# Patient Record
Sex: FEMALE | Race: ASIAN
[De-identification: names, ages, dates, MRNs, and addresses within clinical notes are randomized per-mention and may not be internally consistent; named-entity substitution may affect disease eponyms.]

---

## 2017-06-06 ENCOUNTER — HOSPITAL ENCOUNTER (EMERGENCY)
Dept: HOSPITAL 74 - JER | Age: 77
LOS: 1 days | Discharge: HOME | End: 2017-06-07
Payer: COMMERCIAL

## 2017-06-06 VITALS — BODY MASS INDEX: 26.4 KG/M2

## 2017-06-06 DIAGNOSIS — M47.816: Primary | ICD-10-CM

## 2017-06-06 DIAGNOSIS — G30.9: ICD-10-CM

## 2017-06-06 DIAGNOSIS — F02.80: ICD-10-CM

## 2017-06-06 DIAGNOSIS — E78.00: ICD-10-CM

## 2017-06-06 LAB
APPEARANCE UR: CLEAR
BILIRUB UR STRIP.AUTO-MCNC: NEGATIVE MG/DL
COLOR UR: (no result)
KETONES UR QL STRIP: NEGATIVE
LEUKOCYTE ESTERASE UR QL STRIP.AUTO: (no result)
NITRITE UR QL STRIP: NEGATIVE
PH UR: 7 [PH] (ref 5–8)
PROT UR QL STRIP: NEGATIVE
PROT UR QL STRIP: NEGATIVE
RBC # BLD AUTO: 1 /HPF (ref 0–3)
RBC # UR STRIP: NEGATIVE /UL
UROBILINOGEN UR STRIP-MCNC: NEGATIVE E.U./DL (ref 0.2–1)
WBC # UR AUTO: 11 /HPF (ref 3–5)

## 2017-06-06 PROCEDURE — 3E0233Z INTRODUCTION OF ANTI-INFLAMMATORY INTO MUSCLE, PERCUTANEOUS APPROACH: ICD-10-PCS

## 2017-06-06 NOTE — PDOC
History of Present Illness





<Susan Bolanos - Last Filed: 06/07/17 00:25>





- General


History Source: Patient


Exam Limitations: No Limitations





- History of Present Illness


Initial Comments: 


76 yo F BIBA with a PMHx of HLD and alzheimer's presents with worsening lower 

back pain that radiates down her L leg for two days.  Patients Hx provided via 

 phones as patient speaks Portuguese. Patient notes that when she lifts 

her L leg her L hip hurts. Patient reports falling in January and experiencing 

similar symptoms that went away. Patient also endorses tingling in L foot. 

Patient denies recent trauma. Patient denies numbness. Patient denies fevers, 

chills, nausea, vomiting and diarrhea.  Patient denies chest pain, palpitations 

and lightheadedness. Patient denies problems with urination. 








 phone- [phone]








<GeoffreyyanelyZehra asher - Last Filed: 06/07/17 00:40>





- General


Chief Complaint: Back Pain


Stated Complaint: LOWER BACK PAIN


Time Seen by Provider: 06/06/17 20:59





Past History





- Past Medical History


Suicide Attempt (Hx): No





- Surgical History


Cholecystectomy: Yes (MORE THAN 10 YEARS AGO)





- Psycho/Social/Smoking Cessation Hx


Anxiety: No


Suicidal Ideation: No


Smoking History: Never smoked


Have you smoked in the past 12 months: No


Hx Alcohol Use: No


Drug/Substance Use Hx: No


Substance Use Type: None





<Susan Bolanos - Last Filed: 06/07/17 00:25>





<GeoffreybrooklynZehra - Last Filed: 06/07/17 00:40>





- Past Medical History


Allergies/Adverse Reactions: 


 Allergies











Allergy/AdvReac Type Severity Reaction Status Date / Time


 


No Known Allergies Allergy   Verified 06/06/17 21:13











Home Medications: 


Ambulatory Orders





Nitrofurantoin Macrocrystal [Macrodantin -] 100 mg PO BID #14 capsule 06/07/17 











**Review of Systems





- Review of Systems


Able to Perform ROS?: Yes


Comments:: 


CONSTITUTIONAL: Absent: fever, chills, diaphoresis, generalized weakness, 

malaise, loss of appetite 


HEENT: Absent: rhinorrhea, nasal congestion, throat pain, throat swelling, 

difficulty swallowing, mouth swelling, ear pain, eye pain, visual Changes 


CARDIOVASCULAR: Absent: chest pain, syncope, palpitations, irregular heart rate

, lightheadedness, peripheral edema RESPIRATORY: Absent: cough, shortness of 

breath, dyspnea with exertion, orthopnea, wheezing, stridor, hemoptysis 

GASTROINTESTINAL: Absent: abdominal pain, abdominal distension, nausea, vomiting

, diarrhea, constipation, melena, hematochezia 


GENITOURINARY: Absent: dysuria, frequency, urgency, hesitancy, hematuria, flank 

pain, genital pain 


MUSCULOSKELETAL: + Lower back pain radiating to L leg.  Absent: joint swelling 


SKIN: Absent: rash, itching, pallor


NEUROLOGIC: Absent: headache, focal weakness or paresthesia, dizziness, 

unsteady gait, seizure, mental status changes, bladder or bowel incontinence 


PSYCHIATRIC: Absent: anxiety, depression, suicidal or homicidal ideation, 

hallucination








<JulietZehra - Last Filed: 06/07/17 00:40>





*Physical Exam





- Vital Signs


 Last Vital Signs











Temp Pulse Resp BP Pulse Ox


 


 98.1 F   81   18   124/65   96 


 


 06/06/17 20:54  06/06/17 20:54  06/06/17 20:54  06/06/17 20:54  06/06/17 20:54














<Susan Bolanos - Last Filed: 06/07/17 00:25>





- Vital Signs


 Last Vital Signs











Temp Pulse Resp BP Pulse Ox


 


 98.1 F   81   18   124/65   96 


 


 06/06/17 20:54  06/06/17 20:54  06/06/17 20:54  06/06/17 20:54  06/06/17 20:54














- Physical Exam


Comments: 





GENERAL: 


Well-appearing, well-nourished. No apparent distress.


HEENT: 


Normocephalic, atraumatic. PERRL, EOM intact.


CARDIOVASCULAR: 


Normal S1, S2. Regular rate and rhythm.


PULMONARY: 


Clear to auscultation bilaterally.


ABDOMEN: 


Soft, non-distended, non-tender. 


EXTREMITIES: 


Normal ROM in all four extremities. No gross deformities.


SKIN: 


Warm, dry.  No rash


NEUROLOGICAL: 


No focal neurological deficits. Cranial nerves 2-12 intact. No fecal 

incontinence.  








<JulietZehra - Last Filed: 06/07/17 00:40>





ED Treatment Course





- ADDITIONAL ORDERS


Additional order review: 


 Laboratory  Results











  06/06/17





  22:40


 


Urine Color  Straw


 


Urine Appearance  Clear


 


Urine pH  7.0


 


Urine Protein  Negative


 


Urine Glucose (UA)  Negative


 


Urine Ketones  Negative


 


Urine Blood  Negative


 


Urine Nitrite  Negative


 


Urine Bilirubin  Negative


 


Urine Urobilinogen  Negative


 


Ur Leukocyte Esterase  2+ H


 


Urine RBC  1


 


Urine WBC  11


 


Ur Epithelial Cells  Rare














- RADIOLOGY


Radiograph Interpretation: 


CT Lumbar Spine


Impression: 


L2-L3 moderate degenerative disc disease with mild broad-based disc bulge  L3-

L4 mild broadbase disc bulge probably slightly impinging right L3 nerve root 

with mild to moderate degenerative central spinal canal stenosis. L4-L5 mild 

disc bulge impinging right L4 nerve root with moderate degenerative central 

spinal canal stenosis 








- Medications


Given in the ED: 


ED Medications














Discontinued Medications














Generic Name Dose Route Start Last Admin





  Trade Name Freq  PRN Reason Stop Dose Admin


 


Ketorolac Tromethamine  60 mg 06/06/17 22:28 06/06/17 22:47





  Toradol Injection -  IM 06/06/17 22:29  60 mg





  ONCE ONE   Administration














<Zehra Chung - Last Filed: 06/07/17 00:40>





Medical Decision Making





- Medical Decision Making





06/07/17 00:25


I explained to the patient and her  through a Portuguese  using 

the [employer] phone if she ever experiences any saddle anesthesia, weakness to 

her legs, incontinence of stool that this is an  emergency.  She must return to 

emergency department immediately if this happens





 a copy of the CAT scan was given to the patient to take to her physician in 

[place], [place]





No acute vertebral fractures were appreciated.  However, she had extensive 

degenerative joint disease and some mild nerve impingement with spinal stenosis





pt's pain resolved and she was discharged home





<Susan Bolanos - Last Filed: 06/07/17 00:25>





*DC/Admit/Observation/Transfer





<Susan Bolanos - Last Filed: 06/07/17 00:25>





- Attestations


Scribe Attestion: 





Documentation prepared by Zehra Chung, acting as medical scribe for Susan Bolanos MD/DO.








<Zehra Chung - Last Filed: 06/07/17 00:40>


Diagnosis at time of Disposition: 


Low back pain


Qualifiers:


 Chronicity: unspecified Back pain laterality: unspecified Sciatica presence: 

without sciatica Qualified Code(s): M54.5 - Low back pain





DJD (degenerative joint disease), lumbar


Qualifiers:


 Spinal osteoarthritis complication: unspecified spinal osteoarthritis 

Qualified Code(s): M47.816 - Spondylosis without myelopathy or radiculopathy, 

lumbar region





- Discharge Dispostion


Disposition: HOME


Condition at time of disposition: Stable





- Prescriptions


Prescriptions: 


Nitrofurantoin Macrocrystal [Macrodantin -] 100 mg PO BID #14 capsule





- Patient Instructions


Printed Discharge Instructions:  DI for Low Back Pain


Additional Instructions: 


please see your doctor this week

## 2017-06-07 VITALS — TEMPERATURE: 98 F | SYSTOLIC BLOOD PRESSURE: 125 MMHG | DIASTOLIC BLOOD PRESSURE: 68 MMHG | HEART RATE: 78 BPM

## 2017-06-07 LAB — SP GR UR: 1.01 (ref 1–1.02)

## 2017-06-24 ENCOUNTER — HOSPITAL ENCOUNTER (EMERGENCY)
Dept: HOSPITAL 74 - JER | Age: 77
Discharge: HOME | End: 2017-06-24
Payer: COMMERCIAL

## 2017-06-24 VITALS — HEART RATE: 73 BPM | SYSTOLIC BLOOD PRESSURE: 134 MMHG | DIASTOLIC BLOOD PRESSURE: 72 MMHG

## 2017-06-24 VITALS — TEMPERATURE: 98.3 F

## 2017-06-24 VITALS — BODY MASS INDEX: 23.8 KG/M2

## 2017-06-24 DIAGNOSIS — E78.00: ICD-10-CM

## 2017-06-24 DIAGNOSIS — M54.16: Primary | ICD-10-CM
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
## 2018-08-24 ENCOUNTER — HOSPITAL ENCOUNTER (EMERGENCY)
Dept: HOSPITAL 74 - JER | Age: 78
Discharge: HOME | End: 2018-08-24
Payer: COMMERCIAL

## 2018-08-24 VITALS — HEART RATE: 94 BPM | SYSTOLIC BLOOD PRESSURE: 150 MMHG | DIASTOLIC BLOOD PRESSURE: 85 MMHG

## 2018-08-24 VITALS — BODY MASS INDEX: 27.1 KG/M2

## 2018-08-24 VITALS — TEMPERATURE: 99.8 F

## 2018-08-24 DIAGNOSIS — F02.80: ICD-10-CM

## 2018-08-24 DIAGNOSIS — M48.56XA: ICD-10-CM

## 2018-08-24 DIAGNOSIS — E78.00: ICD-10-CM

## 2018-08-24 DIAGNOSIS — M54.5: Primary | ICD-10-CM

## 2018-08-24 DIAGNOSIS — G30.9: ICD-10-CM

## 2018-08-24 LAB
ANION GAP SERPL CALC-SCNC: 5 MMOL/L (ref 8–16)
APPEARANCE UR: CLEAR
BILIRUB UR STRIP.AUTO-MCNC: NEGATIVE MG/DL
BUN SERPL-MCNC: 15 MG/DL (ref 7–18)
CALCIUM SERPL-MCNC: 8.5 MG/DL (ref 8.5–10.1)
CHLORIDE SERPL-SCNC: 106 MMOL/L (ref 98–107)
CO2 SERPL-SCNC: 31 MMOL/L (ref 21–32)
COLOR UR: (no result)
CREAT SERPL-MCNC: 0.7 MG/DL (ref 0.55–1.02)
DEPRECATED RDW RBC AUTO: 13.7 % (ref 11.6–15.6)
GLUCOSE SERPL-MCNC: 94 MG/DL (ref 74–106)
HCT VFR BLD CALC: 39.6 % (ref 32.4–45.2)
HGB BLD-MCNC: 13.2 GM/DL (ref 10.7–15.3)
KETONES UR QL STRIP: NEGATIVE
LEUKOCYTE ESTERASE UR QL STRIP.AUTO: NEGATIVE
MCH RBC QN AUTO: 28.6 PG (ref 25.7–33.7)
MCHC RBC AUTO-ENTMCNC: 33.4 G/DL (ref 32–36)
MCV RBC: 85.5 FL (ref 80–96)
NITRITE UR QL STRIP: NEGATIVE
PH UR: 7 [PH] (ref 5–8)
PLATELET # BLD AUTO: 187 K/MM3 (ref 134–434)
PMV BLD: 8.3 FL (ref 7.5–11.1)
POTASSIUM SERPLBLD-SCNC: 4.3 MMOL/L (ref 3.5–5.1)
PROT UR QL STRIP: NEGATIVE
PROT UR QL STRIP: NEGATIVE
RBC # BLD AUTO: 4.63 M/MM3 (ref 3.6–5.2)
SODIUM SERPL-SCNC: 142 MMOL/L (ref 136–145)
SP GR UR: 1.01 (ref 1–1.03)
UROBILINOGEN UR STRIP-MCNC: NEGATIVE MG/DL (ref 0.2–1)
WBC # BLD AUTO: 8.3 K/MM3 (ref 4–10)
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
## 2018-08-24 NOTE — PDOC
Attending Attestation





- HPI


HPI: 





08/24/18 15:53





The patient is a 78 year old female with a significant PMH of 

hypercholesterolemia, disc disease, and alzheimer's who presents to the 

emergency department with worsening back pain today. The patient reports that 

her back pain is right sided and non radiating. The patient states that she was 

at home sitting down after breakfast when she felt an onset of back pain. The 

patient's son reports that the patient was seen in the hospital about 1 year 

ago with similar complaints by which she received a shot in her back for pain. 

The patient denies any other symptoms. She denies any fever, chills, nausea, 

vomit, diarrhea, constipation or urinary symptoms. She denies chest pain, 

shortness of breath, headache and dizziness. The patient denies any other 

complaints. 








- Physicial Exam


PE: 





08/24/18 15:53


GENERAL: Awake, alert, and fully oriented, in no acute distress


HEAD: No signs of trauma


EYES: PERRLA, EOMI, sclera anicteric, conjunctiva clear


ENT: Auricles normal inspection, hearing grossly normal, nares patent, 

oropharynx clear without exudates. Moist mucosa


NECK: Normal ROM, supple, no lymphadenopathy, JVD, or masses


LUNGS: Breath sounds equal, clear to auscultation bilaterally.  No wheezes, and 

no crackles


HEART: Regular rate and rhythm, normal S1 and S2, no murmurs, rubs or gallops


ABDOMEN: Soft, nontender, normoactive bowel sounds.  No guarding, no rebound.  

No masses


EXTREMITIES: Normal range of motion, no edema.  No clubbing or cyanosis. No 

cords, erythema, or tenderness


NEUROLOGICAL: (+)paraspinal lumbar spine tenderness. Normal sensation, neuro 

intact. Cranial nerves II through XII grossly intact.  Normal speech, normal 

gait


SKIN: Warm, Dry, normal turgor, no rashes or lesions noted.











Documentation prepared by Huong Bunch, acting as medical scribe for Izzy Turner MD.





<Huong Bunch - Last Filed: 08/24/18 15:53>





- Resident


Resident Name: Jalil Jones





- ED Attending Attestation


I have performed the following: I have examined & evaluated the patient, The 

case was reviewed & discussed with the resident, I agree w/resident's findings 

& plan, Exceptions are as noted





- Medical Decision Making





08/24/18 13:14








I, Dr. Izzy Turner, DO, attest that this document has been prepared under 

my direction and personally reviewed by me in its entirety.   I further attest, 

that it accurately reflects all work, treatment, procedures and medical decision

-making performed by me.  


08/24/18 14:53


a/p: 79yo female with worsening back pain 


-no new trauma. 


-no radiation of pain, no paresthesias, no red flags for acute back pain, no 

loss of control of bowel or bladder, no caude equina symptoms


-will send labs, xray lumbar spine, will medicate for pain


-no abd pain, no palpable masses, hx of lumbar spine djd with hx of epidural 

injection to L spine 


-will need outpt follow up with ortho vs spine


-discussed the plan with the son and the patient who agree with the plan.





08/24/18 17:09


pt with L1 partial compression fx on xray


case discussed with Dr. Arvizu - no neuro deficits on exam, recommends outpt 

follow up in the office


recommends TLSO brace


if pain or neuro deficits occur then return STAT to the ED


08/24/18 17:18


TLSO brace applied to the patient


discussed in detail all reasons to return to the ED


answered all questions


stable for d/c to home


ambulated in the ED





<Izzy Turner - Last Filed: 08/24/18 17:32>





**Discharge Disposition





- Discharge Dispostion


Decision to Admit order: No





<Izzy Turner - Last Filed: 08/24/18 17:32>





- Diagnosis


 Low back pain, Compression fracture of L1 lumbar vertebra








- Discharge Dispostion


Disposition: HOME


Condition at time of disposition: Stable





- Prescriptions


Prescriptions: 


Oxycodone HCl/Acetaminophen [Percocet 5-325 mg Tablet] 1 tab PO Q6H PRN #10 

tablet MDD 4 tabs


 PRN Reason: Pain





- Referrals


Referrals: 


Ceasar Arvizu MD, FA[facility] [Staff Physician] - 





- Patient Instructions


Printed Discharge Instructions:  DI for Vertebral Fracture


Additional Instructions: 


Please call Dr. Arvizu to schedule and appointment. Please return to the Ed 

if you pain worsens. Please be careful taking the percocet. Please wear the 

brace for support. Please return to the ED if you develop weakness or numbness. 

Please follow up with your PMD.

## 2019-06-06 ENCOUNTER — HOSPITAL ENCOUNTER (INPATIENT)
Dept: HOSPITAL 74 - JER | Age: 79
Discharge: LEFT BEFORE DISCHARGE | DRG: 544 | End: 2019-06-06
Attending: GENERAL ACUTE CARE HOSPITAL | Admitting: GENERAL ACUTE CARE HOSPITAL
Payer: COMMERCIAL

## 2019-06-06 VITALS — HEART RATE: 80 BPM | TEMPERATURE: 98.4 F | SYSTOLIC BLOOD PRESSURE: 138 MMHG | DIASTOLIC BLOOD PRESSURE: 84 MMHG

## 2019-06-06 VITALS — BODY MASS INDEX: 27.3 KG/M2
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
## 2019-06-06 DIAGNOSIS — M48.54XA: Primary | ICD-10-CM
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
## 2019-06-06 DIAGNOSIS — E78.5: ICD-10-CM

## 2019-06-06 DIAGNOSIS — F03.90: ICD-10-CM

## 2019-06-06 DIAGNOSIS — I10: ICD-10-CM
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
## 2019-06-06 DIAGNOSIS — M48.04: ICD-10-CM

## 2019-06-06 LAB
ALBUMIN SERPL-MCNC: 4.2 G/DL (ref 3.4–5)
ALP SERPL-CCNC: 98 U/L (ref 45–117)
ALT SERPL-CCNC: 24 U/L (ref 13–61)
ANION GAP SERPL CALC-SCNC: 6 MMOL/L (ref 8–16)
APPEARANCE UR: CLEAR
APPEARANCE UR: CLEAR
AST SERPL-CCNC: 22 U/L (ref 15–37)
BASOPHILS # BLD: 1 % (ref 0–2)
BILIRUB SERPL-MCNC: 0.4 MG/DL (ref 0.2–1)
BILIRUB UR STRIP.AUTO-MCNC: NEGATIVE MG/DL
BILIRUB UR STRIP.AUTO-MCNC: NEGATIVE MG/DL
BUN SERPL-MCNC: 26 MG/DL (ref 7–18)
CALCIUM SERPL-MCNC: 9.4 MG/DL (ref 8.5–10.1)
CHLORIDE SERPL-SCNC: 104 MMOL/L (ref 98–107)
CO2 SERPL-SCNC: 31 MMOL/L (ref 21–32)
COLOR UR: YELLOW
COLOR UR: YELLOW
CREAT SERPL-MCNC: 1.1 MG/DL (ref 0.55–1.3)
DEPRECATED RDW RBC AUTO: 13.2 % (ref 11.6–15.6)
EOSINOPHIL # BLD: 1.6 % (ref 0–4.5)
GLUCOSE SERPL-MCNC: 113 MG/DL (ref 74–106)
HCT VFR BLD CALC: 40.6 % (ref 32.4–45.2)
HGB BLD-MCNC: 13.3 GM/DL (ref 10.7–15.3)
INR BLD: 0.96 (ref 0.83–1.09)
KETONES UR QL STRIP: NEGATIVE
KETONES UR QL STRIP: NEGATIVE
LEUKOCYTE ESTERASE UR QL STRIP.AUTO: NEGATIVE
LEUKOCYTE ESTERASE UR QL STRIP.AUTO: NEGATIVE
LYMPHOCYTES # BLD: 41 % (ref 8–40)
MAGNESIUM SERPL-MCNC: 2.5 MG/DL (ref 1.8–2.4)
MCH RBC QN AUTO: 28.8 PG (ref 25.7–33.7)
MCHC RBC AUTO-ENTMCNC: 32.7 G/DL (ref 32–36)
MCV RBC: 88.2 FL (ref 80–96)
MONOCYTES # BLD AUTO: 4.9 % (ref 3.8–10.2)
NEUTROPHILS # BLD: 51.5 % (ref 42.8–82.8)
NITRITE UR QL STRIP: NEGATIVE
NITRITE UR QL STRIP: NEGATIVE
PH UR: 6 [PH] (ref 5–8)
PH UR: 6.5 [PH] (ref 5–8)
PHOSPHATE SERPL-MCNC: 3.8 MG/DL (ref 2.5–4.9)
PLATELET # BLD AUTO: 230 K/MM3 (ref 134–434)
PMV BLD: 7.4 FL (ref 7.5–11.1)
POTASSIUM SERPLBLD-SCNC: 4.7 MMOL/L (ref 3.5–5.1)
PROT SERPL-MCNC: 7.8 G/DL (ref 6.4–8.2)
PROT UR QL STRIP: NEGATIVE
PT PNL PPP: 11.3 SEC (ref 9.7–13)
RBC # BLD AUTO: 4.6 M/MM3 (ref 3.6–5.2)
SODIUM SERPL-SCNC: 141 MMOL/L (ref 136–145)
SP GR UR: 1.01 (ref 1.01–1.03)
SP GR UR: 1.01 (ref 1.01–1.03)
UROBILINOGEN UR STRIP-MCNC: 0.2 MG/DL (ref 0.2–1)
UROBILINOGEN UR STRIP-MCNC: 0.2 MG/DL (ref 0.2–1)
WBC # BLD AUTO: 8.1 K/MM3 (ref 4–10)

## 2019-06-06 PROCEDURE — G0378 HOSPITAL OBSERVATION PER HR: HCPCS

## 2019-06-06 NOTE — DS
Physical Exam: 


SUBJECTIVE: Patient seen and examined








OBJECTIVE: Afbrile, Hemodynamically Stable.





 Vital Signs











 Period  Temp  Pulse  Resp  BP Sys/Chaney  Pulse Ox


 


 Last 24 Hr  98.1 F-98.9 F  77-84  18-19  138-140/76-84  97-97








PHYSICAL EXAM


HEENT - Atraumatic, Normocephalic.


Heart - S1, S2, RRR


Lungs - Clear to auscultation


Abdomen - Soft, non-tender. Bowel Sounds normal.


Extremities - No edema, no calf tenderness


MS - Kyphosis. No spinal tenderness


Neuro - AAO x 3. Tone/Power normal all 4 extremities








LABS


 Laboratory Results - last 24 hr











  06/06/19 06/06/19 06/06/19





  06:35 08:15 10:30


 


WBC    8.1


 


RBC    4.60


 


Hgb    13.3


 


Hct    40.6


 


MCV    88.2


 


MCH    28.8


 


MCHC    32.7


 


RDW    13.2


 


Plt Count    230  D


 


MPV    7.4 L D


 


Absolute Neuts (auto)    4.2


 


Neutrophils %    51.5  D


 


Lymphocytes %    41.0 H


 


Monocytes %    4.9


 


Eosinophils %    1.6


 


Basophils %    1.0


 


Nucleated RBC %    0


 


PT with INR   


 


INR   


 


Sodium   


 


Potassium   


 


Chloride   


 


Carbon Dioxide   


 


Anion Gap   


 


BUN   


 


Creatinine   


 


Est GFR (CKD-EPI)AfAm   


 


Est GFR (CKD-EPI)NonAf   


 


Random Glucose   


 


Calcium   


 


Phosphorus   


 


Magnesium   


 


Total Bilirubin   


 


AST   


 


ALT   


 


Alkaline Phosphatase   


 


Total Protein   


 


Albumin   


 


Urine Color  Yellow  Yellow 


 


Urine Appearance  Clear  Clear 


 


Urine pH  6.0  6.5 


 


Ur Specific Gravity  1.011  1.008 L 


 


Urine Protein  Negative  Negative 


 


Urine Glucose (UA)  Negative  Negative 


 


Urine Ketones  Negative  Negative 


 


Urine Blood  Negative  Negative 


 


Urine Nitrite  Negative  Negative 


 


Urine Bilirubin  Negative  Negative 


 


Urine Urobilinogen  0.2  0.2 


 


Ur Leukocyte Esterase  Negative  Negative 














  06/06/19 06/06/19





  10:30 10:30


 


WBC  


 


RBC  


 


Hgb  


 


Hct  


 


MCV  


 


MCH  


 


MCHC  


 


RDW  


 


Plt Count  


 


MPV  


 


Absolute Neuts (auto)  


 


Neutrophils %  


 


Lymphocytes %  


 


Monocytes %  


 


Eosinophils %  


 


Basophils %  


 


Nucleated RBC %  


 


PT with INR   11.30


 


INR   0.96


 


Sodium  141 


 


Potassium  4.7 


 


Chloride  104 


 


Carbon Dioxide  31 


 


Anion Gap  6 L 


 


BUN  26 H 


 


Creatinine  1.1 


 


Est GFR (CKD-EPI)AfAm  54.91 


 


Est GFR (CKD-EPI)NonAf  47.38 


 


Random Glucose  113 H 


 


Calcium  9.4 


 


Phosphorus  3.8 


 


Magnesium  2.5 H 


 


Total Bilirubin  0.4 


 


AST  22 


 


ALT  24 


 


Alkaline Phosphatase  98 


 


Total Protein  7.8 


 


Albumin  4.2 


 


Urine Color  


 


Urine Appearance  


 


Urine pH  


 


Ur Specific Gravity  


 


Urine Protein  


 


Urine Glucose (UA)  


 


Urine Ketones  


 


Urine Blood  


 


Urine Nitrite  


 


Urine Bilirubin  


 


Urine Urobilinogen  


 


Ur Leukocyte Esterase  














Date of Admission:06/06/19





Date of Discharge: 06/06/19











Minutes to complete discharge: 35





Discharge Summary


Reason For Visit: COMPRESSION FRACTURE


Hospital Course: 





79 year old female with history of Dementia, HTN, HLD, presented to the ED with 

progressive lower back pain after deep tissue massage 2 months ago, exacerbated 

by fall yesterday. No preceding chest pain/palpitations/lightheadedness. No HI/

LOC. Neurosurgeon evaluated and recommended Brace and MRI Spine. However, 

patient and her  declined MRI and opted to leave AMA.





1. Acute T12 Compression Fracture with Retropulsion


CT L Spine - Acute compression/anterior wedging T12 vertebral body with 

retropulsion of its post/sup margin resulting in moderate canal stenosis.


Neurologically intact


L/S MRI requested


Neurosurgery consult by Dr. Delong ? candidate for kyphoplasty


NeuroSx eval.


Pain control with Percocet q6h - further intensification of pain management by 

NeuroSx


Back Support/Brace


PT





2. HTN - Continue Losartan





3. HLD - Continue Rosuvastatin





4. Dementia - Stable. Continue Memantine





Patient and  want to leave AMA. They were explained at length in [place] 

using [employer] Phone regarding reason for admission including 

risks such as worsening back pain, paralysis, disability, death. They were also 

explained need for MRI by NeuroSurgery who explained need for further work-up, 

brace, etc to patient and . They continue to insist on leaving AMA.


Condition: Fair





- Instructions


Disposition: AGAINST MEDICAL ADVICE





- Home Medications


Comprehensive Discharge Medication List: 


Ambulatory Orders





Memantine HCl [Memantine HCl ER] 14 mg PO DAILY 08/24/18 


Abaloparatide [Tymlos] 1.56 ml SQ DAILY 06/06/19 


Icosapent Ethyl [Vascepa] 2 gm PO BID 06/06/19 


Losartan Potassium 50 mg PO DAILY 06/06/19 


Rosuvastatin [Crestor -] 5 mg PO HS 06/06/19 








This patient is new to me today: Yes


Date on this admission: 06/06/19


Emergency Visit: Yes


ED Registration Date: 06/06/19


Care time: The patient presented to the Emergency Department on the above date 

and was hospitalized for further evaluation of their emergent condition.


Critical Care patient: No





- Discharge Referral


Referred to [facility] No

## 2019-06-06 NOTE — CONSULT
Consult - text type





- Consultation


Consultation Note: 


Ms. Rodriguez is an 78 y/o female who had a fall in her bathroom yesterday and 

presented to the  ED with c/o back pain.  CT L spine showed a severe 

compression fracture of T12 with retropulsion of bone fragments into the spinal 

canal.  The patient was admitted to the medical service for pain control.  I 

had recommended a TLSO Brace and MRI L spine without contrast to evaluate for 

any spinal cord compression.  The patient denied any weakness or paresthesias 

of her LEs.  She denied any bowel or bladder disturbance.  On exam, her LEs 

were 5/5 b/l.  Sensation to LT was intact.  Her patellar reflexes were 

diminished.  I observed her ambulate down the crockett with only mild difficulty.  

The patient received the brace but did not want to wait for the MRI.  I spoke 

to the patient and her  for 30 minutes around 6 pm about the potential 

instability of the fracture and possible need for surgical intervention.  I 

told them that the MRI would provide more information regarding the degree of 

spinal stenosis and spinal cord compression.  I also told them that she is at 

risk for a spinal cord injury and LE paralysis if her fracture was not properly 

stabilized.  I told them there were no guarantees that the brace alone would be 

sufficient.  The patient and her  understood all of these issues and 

signed out AMA.  I strongly encouraged her to wear the brace and call my office 

for a f/u appt.  My discussion with the patient and her  was facilitated 

by a Yi  (ID#[id number]).  I also discussed these issues with the 

hospitalist on service.

## 2019-06-06 NOTE — HP
CHIEF COMPLAINT: back pain





PCP: In [place]





HISTORY OF PRESENT ILLNESS:





Patient is an 80 Yo Georgian speaking F with a PMHx of HTN, HLD, presented to the 

ED because of worsening lower back pain. Patient states she had a deep tissue 

massage 2 months ago and since then she's had persistent, worsening pain. She 

says the masseuse put too much pressure on her back. Currently the pain is a 6/

10, non radiating pain. She said this morning she had a hard time getting out 

of bed which prompted her to go to the ED.  In the ED, she was found with a T12 

compression fracture with retropulsion. Dr. Delong (Neuro Sx) was called 

and requested admission for further evaluation.


Patient currently denies sob, chest pain, nausea, vomiting, fevers, chills, sob

, recent travel, numbness, tingling, weakness, loss of bladder control, urinary 

changes. 








Recent Travel: denies





PAST MEDICAL HISTORY: HTN, HLD





PAST SURGICAL HISTORY: cholecystectomy 10 years ago





Social History:


Smoking: denies


Alcohol: denies


Drugs:  denies





Allergies





No Known Allergies Allergy (Verified 06/06/19 05:56)


 








HOME MEDICATIONS:


 Home Medications











 Medication  Instructions  Recorded


 


Memantine HCl [Memantine HCl ER] 14 mg PO DAILY 08/24/18








REVIEW OF SYSTEMS


CONSTITUTIONAL: 


Absent:  fever, chills, diaphoresis, generalized weakness, malaise, loss of 

appetite, weight change


HEENT: 


Absent:  rhinorrhea, nasal congestion, throat pain, throat swelling, difficulty 

swallowing, mouth swelling, ear pain, eye pain, visual changes


CARDIOVASCULAR: 


Absent: chest pain, syncope, palpitations, irregular heart rate, lightheadedness

, peripheral edema


RESPIRATORY: 


Absent: cough, shortness of breath, dyspnea with exertion, orthopnea, wheezing, 

stridor, hemoptysis


GASTROINTESTINAL:


Absent: abdominal pain, abdominal distension, nausea, vomiting, diarrhea, 

constipation, melena, hematochezia


GENITOURINARY: 


Absent: dysuria, frequency, urgency, hesitancy, hematuria, flank pain, genital 

pain


MUSCULOSKELETAL: 


Absent: myalgia, arthralgia, joint swelling,  neck pain


SKIN: 


Absent: rash, itching, pallor


HEMATOLOGIC/IMMUNOLOGIC: 


Absent: easy bleeding, easy bruising, lymphadenopathy, frequent infections


ENDOCRINE:


Absent: unexplained weight gain, unexplained weight loss, heat intolerance, 

cold intolerance


NEUROLOGIC: 


Absent: headache, focal weakness or paresthesias, dizziness, unsteady gait, 

seizure, mental status changes, bladder or bowel incontinence


PSYCHIATRIC: 


Absent: anxiety, depression, suicidal or homicidal ideation, hallucinations.








PHYSICAL EXAMINATION


 Vital Signs - 24 hr











  06/06/19





  05:46


 


Temperature 98.1 F


 


Pulse Rate 77


 


Respiratory 18





Rate 


 


Blood Pressure 139/76


 


O2 Sat by Pulse 97





Oximetry (%) 











GENERAL: Awake, alert, and fully oriented, in no acute distress.


HEAD: Normal with no signs of trauma.


EYES: Pupils equal, round and reactive to light, extraocular movements intact, 

sclera anicteric, conjunctiva clear. 


EARS, NOSE, THROAT: Ears normal, nares patent, oropharynx clear without 

exudates. Moist mucous membranes.


NECK: Normal range of motion, supple 


LUNGS: Breath sounds equal, clear to auscultation bilaterally. No wheezes, and 

no crackles.


HEART: Regular rate and rhythm, normal S1 and S2 without murmur, rub or gallop.


ABDOMEN: Soft, nontender, not distended, normoactive bowel sounds, no guarding, 

no rebound, no masses.  


MUSCULOSKELETAL: Normal range of motion at all joints. No bony deformities or 

tenderness. No CVA tenderness.


LOWER EXTREMITIES: 2+ pulses, warm, well-perfused. No calf tenderness. No 

peripheral edema. 


NEUROLOGICAL:  Cranial nerves II-XII intact. Normal speech. Normal gait.





 Laboratory Results - last 24 hr











  06/06/19 06/06/19





  06:35 08:15


 


Urine Color  Yellow  Yellow


 


Urine Appearance  Clear  Clear


 


Urine pH  6.0  6.5


 


Ur Specific Gravity  1.011  1.008 L


 


Urine Protein  Negative  Negative


 


Urine Glucose (UA)  Negative  Negative


 


Urine Ketones  Negative  Negative


 


Urine Blood  Negative  Negative


 


Urine Nitrite  Negative  Negative


 


Urine Bilirubin  Negative  Negative


 


Urine Urobilinogen  0.2  0.2


 


Ur Leukocyte Esterase  Negative  Negative











ASSESSMENT/PLAN:








80 Yo Georgian speaking F with a PMHx of HTN, HLD, presented to the ED because of 

worsening lower back pain.








#T12 Compression Fx with Retropulsion


-Neuro sx consulted: Dr. Delong


-Pain control with Percocet q6h


-NPO for now


-Lumbar/Thoracic MRI ordered


-PT








#HTN/HLD


-cont. Losartan 50mg daily


-Rosovastatin 5mg daily








#FEN


-no Iv fluids


-monitor


-npo





#DVt Ppx


-hep sq





dispo: med-surge























Visit type





- Emergency Visit


Emergency Visit: Yes


ED Registration Date: 06/06/19


Care time: The patient presented to the Emergency Department on the above date 

and was hospitalized for further evaluation of their emergent condition.





- New Patient


This patient is new to me today: Yes


Date on this admission: 06/08/19





- Critical Care


Critical Care patient: No

## 2019-06-06 NOTE — PN
Teaching Attending Note


Name of Resident: Roly Prieto





ATTENDING PHYSICIAN STATEMENT





I saw and evaluated the patient.


I reviewed the resident's note and discussed the case with the resident.


I agree with the resident's findings and plan as documented.








SUBJECTIVE: Complains of back pain








OBJECTIVE: Afebrile, Hemodynamically Stable





 Last Vital Signs











Temp Pulse Resp BP Pulse Ox


 


 98.1 F   77   18   139/76   97 


 


 06/06/19 05:46  06/06/19 05:46  06/06/19 05:46  06/06/19 05:46  06/06/19 05:46











HEENT - Atraumatic, Normocephalic.


Heart - S1, S2, RRR


Lungs - Clear to auscultation


Abdomen - Soft, non-tender. Bowel Sounds normal.


Extremities - No edema, no calf tenderness


MS - Kyphosis. No spinal tenderness


Neuro - AAO x 3. Tone/Power normal all 4 extremities


 Laboratory Results - last 24 hr











  06/06/19 06/06/19 06/06/19





  06:35 08:15 10:30


 


WBC    8.1


 


RBC    4.60


 


Hgb    13.3


 


Hct    40.6


 


MCV    88.2


 


MCH    28.8


 


MCHC    32.7


 


RDW    13.2


 


Plt Count    230  D


 


MPV    7.4 L D


 


Absolute Neuts (auto)    4.2


 


Neutrophils %    51.5  D


 


Lymphocytes %    41.0 H


 


Monocytes %    4.9


 


Eosinophils %    1.6


 


Basophils %    1.0


 


Nucleated RBC %    0


 


PT with INR   


 


INR   


 


Sodium   


 


Potassium   


 


Chloride   


 


Carbon Dioxide   


 


Anion Gap   


 


BUN   


 


Creatinine   


 


Est GFR (CKD-EPI)AfAm   


 


Est GFR (CKD-EPI)NonAf   


 


Random Glucose   


 


Calcium   


 


Phosphorus   


 


Magnesium   


 


Total Bilirubin   


 


AST   


 


ALT   


 


Alkaline Phosphatase   


 


Total Protein   


 


Albumin   


 


Urine Color  Yellow  Yellow 


 


Urine Appearance  Clear  Clear 


 


Urine pH  6.0  6.5 


 


Ur Specific Gravity  1.011  1.008 L 


 


Urine Protein  Negative  Negative 


 


Urine Glucose (UA)  Negative  Negative 


 


Urine Ketones  Negative  Negative 


 


Urine Blood  Negative  Negative 


 


Urine Nitrite  Negative  Negative 


 


Urine Bilirubin  Negative  Negative 


 


Urine Urobilinogen  0.2  0.2 


 


Ur Leukocyte Esterase  Negative  Negative 














  06/06/19 06/06/19





  10:30 10:30


 


WBC  


 


RBC  


 


Hgb  


 


Hct  


 


MCV  


 


MCH  


 


MCHC  


 


RDW  


 


Plt Count  


 


MPV  


 


Absolute Neuts (auto)  


 


Neutrophils %  


 


Lymphocytes %  


 


Monocytes %  


 


Eosinophils %  


 


Basophils %  


 


Nucleated RBC %  


 


PT with INR   11.30


 


INR   0.96


 


Sodium  141 


 


Potassium  4.7 


 


Chloride  104 


 


Carbon Dioxide  31 


 


Anion Gap  6 L 


 


BUN  26 H 


 


Creatinine  1.1 


 


Est GFR (CKD-EPI)AfAm  54.91 


 


Est GFR (CKD-EPI)NonAf  47.38 


 


Random Glucose  113 H 


 


Calcium  9.4 


 


Phosphorus  3.8 


 


Magnesium  2.5 H 


 


Total Bilirubin  0.4 


 


AST  22 


 


ALT  24 


 


Alkaline Phosphatase  98 


 


Total Protein  7.8 


 


Albumin  4.2 


 


Urine Color  


 


Urine Appearance  


 


Urine pH  


 


Ur Specific Gravity  


 


Urine Protein  


 


Urine Glucose (UA)  


 


Urine Ketones  


 


Urine Blood  


 


Urine Nitrite  


 


Urine Bilirubin  


 


Urine Urobilinogen  


 


Ur Leukocyte Esterase  








 Current Medications











Generic Name Dose Route Start Last Admin





  Trade Name Freq  PRN Reason Stop Dose Admin


 


Heparin Sodium (Porcine)  5,000 unit  06/06/19 18:00  





  Heparin -  SQ   





  Q8H-IV [facility]   





     





     





     





     


 


Losartan Potassium  50 mg  06/07/19 10:00  





  Cozaar -  PO   





  DAILY [facility]   





     





     





     





     


 


Non-Formulary Medication  14 mg  06/07/19 10:00  





  Memantine Hcl [Memantine Hcl Er]  PO   





  DAILY [facility]   





     





     





     





     


 


Rosuvastatin Calcium  5 mg  06/07/19 22:00  





  Crestor -  PO   





  HS [facility]   





     





     





     





     











 Home Medications











 Medication  Instructions  Recorded


 


Memantine HCl [Memantine HCl ER] 14 mg PO DAILY 08/24/18


 


Abaloparatide [Tymlos] 1.56 ml SQ DAILY 06/06/19


 


Icosapent Ethyl [Vascepa] 2 gm PO BID 06/06/19


 


Losartan Potassium 50 mg PO DAILY 06/06/19


 


Rosuvastatin [Crestor -] 5 mg PO HS 06/06/19











ASSESSMENT AND PLAN:





79 year old female with history of Dementia, HTN, HLD, presented to the ED with 

progressive lower back pain after deep tissue massage 2 months ago, exacerbated 

by fall yesterday. No preceeding chest pain/palpitations/lightheadedness. No HI/

LOC. 





1. Acute T12 Compression Fracture with Retropulsion


CT L SPine - Acute compression/anterior wedging T12 vertebral body with 

retropulsion of its post/sup margin resulting in moderate canal stenosis.


Neurologically intact


L/S MRI requested


Neurosurgery consult by Dr. Delong ? candidate for kyphoplasty


NeuroSx eval.


Pain control with Percocet q6h - further intensification of pain management by 

NeuroSx


PT





2. HTN - Continue Losartan





3. HLD - Continue Rosuvastatin





4. Dementia - Stable. Continue Memantine





DVT Px - SCDs. Will hold Heparin/Lovenox given possible NeuroSx intervention.








ADDENDUM





Patient and  want to leave AMA. They were explained at length in [place] 

using [employer] Phone regarding reason for admission including 

risks such as worsening back pain, paralysis, disability, death. They were also 

explained need for MRI by NeuroSurgery who explained need for further work-up, 

brace, etc to patient and . They continue to insist on leaving AMA.

## 2019-06-06 NOTE — PDOC
History of Present Illness





- General


Chief Complaint: Back Pain


Stated Complaint: BACK PAIN


Time Seen by Provider: 06/06/19 07:07





- History of Present Illness


Initial Comments: 





06/06/19 08:26


79 F with h/o HLD presents to ED with 3 weeks of lower back pain. Pt states 

that her pain initially started after getting a massage last year. She states 

that the masseuse put too much pressure on her lower back. She felt very sore 

immediately after the massage, and the next morning, she states her lower back 

was so tight that she had difficulty getting out of bed. The pain has acutely 

worsened over the past 3 weeks. Denies any additional falls or trauma. Pt 

denies weakness/numbness in her legs. Denies any saddle anesthesia. No 

difficulty voiding or incontinence. Pt states that she has been ambulating 

normally. Her pain is localized more to the L side.








Past History





- Past Medical History


Allergies/Adverse Reactions: 


 Allergies











Allergy/AdvReac Type Severity Reaction Status Date / Time


 


No Known Allergies Allergy   Verified 06/06/19 05:56











Home Medications: 


Ambulatory Orders





Memantine HCl [Memantine HCl ER] 14 mg PO DAILY 08/24/18 


Abaloparatide [Tymlos] 1.56 ml SQ DAILY 06/06/19 


Icosapent Ethyl [Vascepa] 2 gm PO BID 06/06/19 


Losartan Potassium 50 mg PO DAILY 06/06/19 


Rosuvastatin [Crestor -] 5 mg PO HS 06/06/19 








COPD: No


HTN: Yes


Hypercholesterolemia: Yes





- Surgical History


Cholecystectomy: Yes (MORE THAN 10 YEARS AGO)





- Immunization History


Immunization Up to Date: Yes





- Suicide/Smoking/Psychosocial Hx


Smoking History: Never smoked


Have you smoked in the past 12 months: No


Information on smoking cessation initiated: No


Hx Alcohol Use: No


Drug/Substance Use Hx: No


Substance Use Type: None





**Review of Systems





- Review of Systems


Comments:: 





06/06/19 08:29


"GENERAL/CONSTITUTIONAL: No fever or chills. No weakness.


HEAD, EYES, EARS, NOSE AND THROAT: No change in vision. No ear pain or 

discharge. No sore throat.


CARDIOVASCULAR: No chest pain, no shortness of breath, no loss of consciousness


RESPIRATORY: No cough, wheezing, or hemoptysis.


GASTROINTESTINAL: No nausea, vomiting, diarrhea or constipation.


GENITOURINARY: No dysuria, frequency, or change in urination.


MUSCULOSKELETAL: + lower back pain


SKIN: No rash


NEUROLOGIC: No vertigo, no change in strength/sensation.


ENDOCRINE: No increased thirst. No abnormal weight change.


HEMATOLOGIC/LYMPHATIC: No anemia, easy bleeding, or history of blood clots.


ALLERGIC/IMMUNOLOGIC: No hives or skin allergy.








*Physical Exam





- Vital Signs


 Last Vital Signs











Temp Pulse Resp BP Pulse Ox


 


 98.1 F   77   18   139/76   97 


 


 06/06/19 05:46  06/06/19 05:46  06/06/19 05:46  06/06/19 05:46  06/06/19 05:46














- Physical Exam


Comments: 





06/06/19 08:29


"GENERAL: Awake, alert, and fully oriented, in no acute distress.


HEAD: No signs of trauma


EYES: PERRLA, EOMI, sclera anicteric, conjunctiva clear


ENT: Auricles normal inspection, hearing grossly normal, nares patent, 

oropharynx clear without exudates. Moist mucosa


NECK: Nontender, no stepoffs, Normal ROM, supple, no lymphadenopathy, JVD, or 

masses


LUNGS: Breath sounds equal, clear to auscultation bilaterally.  No wheezes, and 

no crackles


HEART: Regular rate and rhythm, normal S1 and S2, no murmurs, rubs or gallops


ABDOMEN: Soft, nontender, normoactive bowel sounds.  No guarding, no rebound.  

No masses


EXTREMITIES: Normal range of motion, no edema.  No clubbing or cyanosis. No 

cords, erythema, or tenderness


NEUROLOGICAL: Cranial nerves II through XII intact. 5/5 strength and sensation 

in all extremities, Normal speech, normal gait, normal cerebellar function


SKIN: Warm, Dry, normal turgor, no rashes or lesions noted.


BACK: + L lumbar paraspinal TTP, no stepoffs, no midline tenderness, 

neurovascularly intact BLE





ED Treatment Course





- LABORATORY


CBC & Chemistry Diagram: 


 06/06/19 10:30





 06/06/19 10:30





- ADDITIONAL ORDERS


Additional order review: 


 Laboratory  Results











  06/06/19





  06:35


 


Urine Color  Yellow


 


Urine Appearance  Clear


 


Urine pH  6.0


 


Ur Specific [place]  1.011


 


Urine Protein  Negative


 


Urine Glucose (UA)  Negative


 


Urine Ketones  Negative


 


Urine Blood  Negative


 


Urine Nitrite  Negative


 


Urine Bilirubin  Negative


 


Urine Urobilinogen  0.2


 


Ur Leukocyte Esterase  Negative














- RADIOLOGY


Radiology Studies Ordered: 














 Category Date Time Status


 


 LUMBAR SPINE CT W/O CONTRAST [CT] Stat CT Scan  06/06/19 07:58 Ordered














- Medications


Given in the ED: 


ED Medications














Discontinued Medications














Generic Name Dose Route Start Last Admin





  Trade Name Rachna  PRN Reason Stop Dose Admin


 


Ketorolac Tromethamine  30 mg  06/06/19 07:58  06/06/19 08:00





  Toradol Injection -  IM  06/06/19 07:59  30 mg





  ONCE ONE   Administration





     





     





     





     














Medical Decision Making





- Medical Decision Making





06/06/19 08:30


79 F with lower back pain. No signs of cauda equina or cord compression on 

exam. No fevers to suggest epidural abscess. Pt ambulatory with normal neuro 

exam.


- CT L spine to r/o fx


- Toradol





06/06/19 10:09


CT shows T12 compression fx with retropulsion





Case discussed with Dr. Delong, airam on call, who recommends admission to 

hospital for MRI and pain control/brace.





*DC/Admit/Observation/Transfer


Diagnosis at time of Disposition: 


 Compression fracture








- Discharge Dispostion


Condition at time of disposition: Fair


Decision to Admit order: Yes





- Referrals





- Patient Instructions





- Post Discharge Activity





- Attestations


Physician Attestion: 





06/06/19 10:10








I, Dr. Per Valdes MD, attest that this document has been prepared under my 

direction and personally reviewed by me in its entirety.   I further attest, 

that it accurately reflects all work, treatment, procedures and medical decision

-making performed by me.

## 2020-04-25 ENCOUNTER — HOSPITAL ENCOUNTER (EMERGENCY)
Dept: HOSPITAL 74 - JER | Age: 80
Discharge: LEFT BEFORE DISCHARGE | End: 2020-04-25
Payer: COMMERCIAL

## 2020-04-25 VITALS — BODY MASS INDEX: 27.4 KG/M2

## 2020-04-25 VITALS — SYSTOLIC BLOOD PRESSURE: 145 MMHG | HEART RATE: 82 BPM | DIASTOLIC BLOOD PRESSURE: 78 MMHG

## 2020-04-25 VITALS — TEMPERATURE: 98.4 F

## 2020-04-25 DIAGNOSIS — R07.9: Primary | ICD-10-CM

## 2020-04-25 LAB
ALBUMIN SERPL-MCNC: 3.5 G/DL (ref 3.4–5)
ALP SERPL-CCNC: 98 U/L (ref 45–117)
ALT SERPL-CCNC: 24 U/L (ref 13–61)
ANION GAP SERPL CALC-SCNC: 7 MMOL/L (ref 8–16)
AST SERPL-CCNC: 19 U/L (ref 15–37)
BASOPHILS # BLD: 0.8 % (ref 0–2)
BILIRUB SERPL-MCNC: 0.5 MG/DL (ref 0.2–1)
BUN SERPL-MCNC: 22.1 MG/DL (ref 7–18)
CALCIUM SERPL-MCNC: 8 MG/DL (ref 8.5–10.1)
CHLORIDE SERPL-SCNC: 107 MMOL/L (ref 98–107)
CO2 SERPL-SCNC: 27 MMOL/L (ref 21–32)
CREAT SERPL-MCNC: 0.9 MG/DL (ref 0.55–1.3)
DEPRECATED RDW RBC AUTO: 13.1 % (ref 11.6–15.6)
EOSINOPHIL # BLD: 2.9 % (ref 0–4.5)
GLUCOSE SERPL-MCNC: 113 MG/DL (ref 74–106)
HCT VFR BLD CALC: 37 % (ref 32.4–45.2)
HGB BLD-MCNC: 12.4 GM/DL (ref 10.7–15.3)
LIPASE SERPL-CCNC: 402 U/L (ref 73–393)
LYMPHOCYTES # BLD: 45.5 % (ref 8–40)
MCH RBC QN AUTO: 29.3 PG (ref 25.7–33.7)
MCHC RBC AUTO-ENTMCNC: 33.4 G/DL (ref 32–36)
MCV RBC: 87.6 FL (ref 80–96)
MONOCYTES # BLD AUTO: 5.8 % (ref 3.8–10.2)
NEUTROPHILS # BLD: 45 % (ref 42.8–82.8)
PLATELET # BLD AUTO: 223 K/MM3 (ref 134–434)
PMV BLD: 7.9 FL (ref 7.5–11.1)
POTASSIUM SERPLBLD-SCNC: 3.9 MMOL/L (ref 3.5–5.1)
PROT SERPL-MCNC: 6.9 G/DL (ref 6.4–8.2)
RBC # BLD AUTO: 4.22 M/MM3 (ref 3.6–5.2)
SODIUM SERPL-SCNC: 141 MMOL/L (ref 136–145)
WBC # BLD AUTO: 8.2 K/MM3 (ref 4–10)

## 2020-04-25 PROCEDURE — 3E033GC INTRODUCTION OF OTHER THERAPEUTIC SUBSTANCE INTO PERIPHERAL VEIN, PERCUTANEOUS APPROACH: ICD-10-PCS | Performed by: EMERGENCY MEDICINE

## 2020-04-25 NOTE — PDOC
Attending Attestation





- Resident


Resident Name: Radha Guevara





- ED Attending Attestation


I have performed the following: I have examined & evaluated the patient, The 

case was reviewed & discussed with the resident, I agree w/resident's findings &

plan





- HPI


HPI: 





04/25/20 01:39


Pt comes with [facility]; pain began early yesterday and has been on off like a 

stabbing sensation.  She has no radiation of the pain and pain is not pleuritic 

and she has no cough and no chills. She has no abdominal pain and no nausea and 

no vomiting and no diarrhea, and no fever.


04/25/20 01:45








- Physicial Exam


PE: 





04/25/20 01:46


Normal exam


HEENT normal


Normal heart rate; [id number].


Normal lungs


legs no C/C/E


SHe has no abd pain or flank pain


afebrile no rashes





Agree with resident exam





- Medical Decision Making





04/25/20 01:47


Gven pt's age and symptoms, we will do a cardiac workup.


04/25/20 02:59


Pt has elevated lipase. 


Her CXR shows ground glass appearance.


Pt will be admitted


04/25/20 03:01


Pulsox is 97%; we will treat for comm acquired pneumo.


She will be admitted


04/25/20 03:05


Pt refusing to stay; she wants to sign AMA.


SHe will be given a zpak to go home with 








Discharge





- Discharge Information


Problems reviewed: Yes


Clinical Impression/Diagnosis: 


Chest pain


Qualifiers:


 Chest pain type: unspecified Qualified Code(s): R07.9 - Chest pain, unspecified





Condition: Stable


Disposition: AGAINST MEDICAL ADVICE





- Additional Discharge Information


Prescriptions: 


Methocarbamol [Robaxin -] 500 mg PO BID #10 tablet


Azithromycin [Zithromax 250mg Tablets -] 250 mg PO UTDICT #6 tab





- Follow up/Referral





- Patient Discharge Instructions


Additional Instructions: 


gyic-bbpqiws-zoqz hyungtong-george pinag-i mark.


egseuleineun kolona baileoseue uisimseuleoun jinghuleul boyeojubnida. jib-e iss-

eumyeonseo son bernadine-chris rodríguez geos-i johseubnida. hangsaengje 

kshanvcv-brq-b yaggug-eulo bonaejyeossseubnida. geugeos-lorraine johnson-

ilago bulliumyeo jisidaelo bog-yonghasibsio. noadeshaun raman kyleighun-richard iwjohngiovannijohana 

yaggug-e bonaessda. rogers samuel uisawa yagsoghasibsio.





tongjeung-i simhaejigeona hoheub-i eolyeowo jigeona saeloun jeungsang-howard 

ulyeodoeneun jeungsang-i natanamyeon eung-geubsillo dol-a osibsio.





wilihabnida





You were seen in the ER for chest pain and back pain. 


The xray shows signs suspicious for coronavirus. I recommend that you stay at 

home and practice good hand hygiene. A prescription for a antibiotic was sent to

your pharmacy. It is called Azithromycin, take it as directed. I also sent a 

muscle relaxant to the pharmacy, take as directed. Please make an appointment 

with your doctor this week. 





Come back to the ER if you have worsening pain, have difficulty breathing or if 

any new or concernign symptom develops. 





Thank you





- Post Discharge Activity

## 2020-04-25 NOTE — PDOC
History of Present Illness





- General


Chief Complaint: Pain, Acute


Stated Complaint: ACID REFLUX


History Source: Patient,  Used


Exam Limitations: Language Barrier





- History of Present Illness


Initial Comments: 





04/25/20 01:50


* used*


80y F with PMH of HTN, HLD presenting to the ER for chest pain. Pt describes the

chest pain as a sharp pain which does not radiate. She did mention the pain was 

epigastric but now in the chest. Denies shortness of breath, cough, pleuritic 

chest pain, n/v/d, back pain, recent surgeries, headache, fevers, chills, leg 

swelling, dyspnea, sick contacts, history of MI in the family, smoking. Has not 

taken anything for the pain. She thinks it is acid reflux. 





PMD: 


PMH: see hpi


PSH: none


Meds: lipitor, bp med


Allergies: nkda


Social: denies





Past History





- Past Medical History


Allergies/Adverse Reactions: 


                                    Allergies











Allergy/AdvReac Type Severity Reaction Status Date / Time


 


No Known Allergies Allergy   Verified 04/25/20 01:12











Home Medications: 


Ambulatory Orders





Memantine HCl [Memantine HCl ER] 14 mg PO DAILY 08/24/18 


Abaloparatide [Tymlos] 1.56 ml SQ DAILY 06/06/19 


Icosapent Ethyl [Vascepa] 2 gm PO BID 06/06/19 


Losartan Potassium 50 mg PO DAILY 06/06/19 


Rosuvastatin [Crestor -] 5 mg PO HS 06/06/19 


Azithromycin [Zithromax 250mg Tablets -] 250 mg PO UTDICT #6 tab 04/25/20 


Methocarbamol [Robaxin -] 500 mg PO BID #10 tablet 04/25/20 








COPD: No


HTN: Yes


Hypercholesterolemia: Yes





- Surgical History


Cholecystectomy: Yes (MORE THAN 10 YEARS AGO)





- Immunization History


Immunization Up to Date: Yes





- Psycho Social/Smoking Cessation Hx


Smoking History: Never smoked


Have you smoked in the past 12 months: No


Information on smoking cessation initiated: No


Hx Alcohol Use: No


Drug/Substance Use Hx: No


Substance Use Type: None


Hx Substance Use Treatment: No





*Physical Exam





- Vital Signs


                                Last Vital Signs











Temp Pulse Resp BP Pulse Ox


 


 98.4 F   84   20   177/84 H  97 


 


 04/25/20 01:12  04/25/20 01:12  04/25/20 01:12  04/25/20 01:12  04/25/20 01:12














ED Treatment Course





- LABORATORY


CBC & Chemistry Diagram: 


                                 04/25/20 01:25





                                 04/25/20 01:25





Medical Decision Making





- Medical Decision Making





04/25/20 01:59


80y F with PMH of HTN, HLD presenting with chest pain


vitals; hypertensive. 





ddx includes acs, pna, ptx, covid, gastritis/gerd, pancreatitis. low suspicion 

for dissection. pain for >12h, low suspicion for acute acs. 





will obtain cbc, cmp, lipase, trop


ekg, cxr


-pepcid, maalox. 








04/25/20 02:02


ekg: nsr at 83bpm. no madi or depressions normal intervals 


04/25/20 02:33





04/25/20 03:15


no leukocytosis, hgb wnl. trop negative. electrolytes wnl. lipase 402. 


pt states she has pain in the thoracic spine (chronic) for years. 





will give tylenol and robaxin. 


pt has comorbidities, has chest pain and xray shows infiltrate cuold be covid. 


will admit pt. 





pt refusing to stay, saying she is feeling better. will sign out AMA. 


pt told that she should return if symptoms get worse. 





pt signed AMA





Discharge





- Discharge Information


Problems reviewed: Yes


Clinical Impression/Diagnosis: 


Chest pain


Qualifiers:


 Chest pain type: unspecified Qualified Code(s): R07.9 - Chest pain, unspecified





Condition: Stable


Disposition: AGAINST MEDICAL ADVICE





- Admission


No





- Additional Discharge Information


Prescriptions: 


Methocarbamol [Robaxin -] 500 mg PO BID #10 tablet


Azithromycin [Zithromax 250mg Tablets -] 250 mg PO UTDICT #6 tab





- Follow up/Referral





- Patient Discharge Instructions


Additional Instructions: 


sgnu-ewkyhns-zphw hyungblankg-gwa yotong-i natanassseubnida.


egseuleineun kolona baileoseue uisimseuleoun jinghuleul boyeojubnida. jib-e iss-

eumyeonseo son bernadine-chris rodríguez geos-i johseubnida. seng petersenng-yag-i yaggug-kassy springerossseubnida. maria m-lorraine jackson maisin-

ilago bulliumyeo jisidaelo bog-yonghasibsio. marcia mast-richard mejias-e bonaessda. rogers maguire.





tongjeung-i simhaejigeona hoheub-i eolyeowo jigeona saeloun jeungsang-howard 

ulyeodoeneun jeungsang-i natanamyeon eung-geubsillo dol-a osibsio.





gamsahabnida





You were seen in the ER for chest pain and back pain. 


The xray shows signs suspicious for coronavirus. I recommend that you stay at 

home and practice good hand hygiene. A prescription for a antibiotic was sent to

your pharmacy. It is called Azithromycin, take it as directed. I also sent a 

muscle relaxant to the pharmacy, take as directed. Please make an appointment 

with your doctor this week. 





Come back to the ER if you have worsening pain, have difficulty breathing or if 

any new or concernign symptom develops. 





Thank you





- Post Discharge Activity

## 2020-04-27 NOTE — EKG
Test Reason : 

Blood Pressure : ***/*** mmHG

Vent. Rate : 083 BPM     Atrial Rate : 083 BPM

   P-R Int : 174 ms          QRS Dur : 084 ms

    QT Int : 382 ms       P-R-T Axes : 001 -11 012 degrees

   QTc Int : 448 ms

 

NORMAL SINUS RHYTHM

NORMAL ECG

WHEN COMPARED WITH ECG OF 05-JUL-2014 18:26,

NO SIGNIFICANT CHANGE WAS FOUND

Confirmed by THADDEUS MAYES MD ([id number]) on 4/27/2020 5:38:14 PM

 

Referred By:             Confirmed By:THADDEUS MAYES MD

## 2022-05-17 ENCOUNTER — HOSPITAL ENCOUNTER (EMERGENCY)
Dept: HOSPITAL 74 - JER | Age: 82
LOS: 1 days | Discharge: LEFT BEFORE DISCHARGE | End: 2022-05-18
Payer: COMMERCIAL

## 2022-05-17 VITALS — BODY MASS INDEX: 26.5 KG/M2

## 2022-05-17 VITALS — SYSTOLIC BLOOD PRESSURE: 167 MMHG | TEMPERATURE: 97.7 F | DIASTOLIC BLOOD PRESSURE: 89 MMHG | HEART RATE: 90 BPM

## 2022-05-17 DIAGNOSIS — R31.9: Primary | ICD-10-CM

## 2022-05-18 LAB
ALBUMIN SERPL-MCNC: 3.8 G/DL (ref 3.4–5)
ALP SERPL-CCNC: 85 U/L (ref 45–117)
ALT SERPL-CCNC: 33 U/L (ref 13–61)
ANION GAP SERPL CALC-SCNC: 4 MMOL/L (ref 8–16)
APTT BLD: 31.4 SECONDS (ref 25.2–36.5)
AST SERPL-CCNC: 39 U/L (ref 15–37)
BASOPHILS # BLD: 1.2 % (ref 0–2)
BILIRUB SERPL-MCNC: 0.3 MG/DL (ref 0.2–1)
BUN SERPL-MCNC: 19.2 MG/DL (ref 7–18)
CALCIUM SERPL-MCNC: 8.9 MG/DL (ref 8.5–10.1)
CHLORIDE SERPL-SCNC: 108 MMOL/L (ref 98–107)
CO2 SERPL-SCNC: 28 MMOL/L (ref 21–32)
CREAT SERPL-MCNC: 1 MG/DL (ref 0.55–1.3)
DEPRECATED RDW RBC AUTO: 13.3 % (ref 11.6–15.6)
EOSINOPHIL # BLD: 2.8 % (ref 0–4.5)
GLUCOSE SERPL-MCNC: 116 MG/DL (ref 74–106)
HCT VFR BLD CALC: 38 % (ref 32.4–45.2)
HGB BLD-MCNC: 12.4 GM/DL (ref 10.7–15.3)
INR BLD: 0.93 (ref 0.83–1.09)
LYMPHOCYTES # BLD: 35.8 % (ref 8–40)
MCH RBC QN AUTO: 28.3 PG (ref 25.7–33.7)
MCHC RBC AUTO-ENTMCNC: 32.7 G/DL (ref 32–36)
MCV RBC: 86.4 FL (ref 80–96)
MONOCYTES # BLD AUTO: 6 % (ref 3.8–10.2)
NEUTROPHILS # BLD: 54.2 % (ref 42.8–82.8)
PLATELET # BLD AUTO: 260 10^3/UL (ref 134–434)
PMV BLD: 7.8 FL (ref 7.5–11.1)
PROT SERPL-MCNC: 7.6 G/DL (ref 6.4–8.2)
PT PNL PPP: 10.7 SEC (ref 9.7–13)
RBC # BLD AUTO: 4.4 M/MM3 (ref 3.6–5.2)
SODIUM SERPL-SCNC: 139 MMOL/L (ref 136–145)
WBC # BLD AUTO: 10.2 K/MM3 (ref 4–10)